# Patient Record
Sex: FEMALE | Race: ASIAN | NOT HISPANIC OR LATINO | URBAN - METROPOLITAN AREA
[De-identification: names, ages, dates, MRNs, and addresses within clinical notes are randomized per-mention and may not be internally consistent; named-entity substitution may affect disease eponyms.]

---

## 2020-06-01 ENCOUNTER — EMERGENCY (EMERGENCY)
Facility: HOSPITAL | Age: 80
LOS: 1 days | Discharge: HOME | End: 2020-06-01
Attending: EMERGENCY MEDICINE
Payer: MEDICARE

## 2020-06-01 VITALS
RESPIRATION RATE: 16 BRPM | HEART RATE: 114 BPM | WEIGHT: 98.11 LBS | OXYGEN SATURATION: 97 % | DIASTOLIC BLOOD PRESSURE: 94 MMHG | SYSTOLIC BLOOD PRESSURE: 196 MMHG | TEMPERATURE: 99 F | HEIGHT: 57 IN

## 2020-06-01 VITALS
RESPIRATION RATE: 17 BRPM | HEART RATE: 81 BPM | OXYGEN SATURATION: 97 % | TEMPERATURE: 98 F | DIASTOLIC BLOOD PRESSURE: 62 MMHG | SYSTOLIC BLOOD PRESSURE: 136 MMHG

## 2020-06-01 DIAGNOSIS — Z98.890 OTHER SPECIFIED POSTPROCEDURAL STATES: Chronic | ICD-10-CM

## 2020-06-01 PROCEDURE — 71250 CT THORAX DX C-: CPT

## 2020-06-01 PROCEDURE — 71250 CT THORAX DX C-: CPT | Mod: 26

## 2020-06-01 PROCEDURE — 74176 CT ABD & PELVIS W/O CONTRAST: CPT | Mod: 26

## 2020-06-01 PROCEDURE — 82962 GLUCOSE BLOOD TEST: CPT

## 2020-06-01 PROCEDURE — 72131 CT LUMBAR SPINE W/O DYE: CPT | Mod: 26

## 2020-06-01 PROCEDURE — 99285 EMERGENCY DEPT VISIT HI MDM: CPT

## 2020-06-01 PROCEDURE — 99284 EMERGENCY DEPT VISIT MOD MDM: CPT | Mod: 25

## 2020-06-01 PROCEDURE — 74176 CT ABD & PELVIS W/O CONTRAST: CPT

## 2020-06-01 PROCEDURE — 99283 EMERGENCY DEPT VISIT LOW MDM: CPT

## 2020-06-01 PROCEDURE — 72128 CT CHEST SPINE W/O DYE: CPT | Mod: 26

## 2020-06-01 RX ORDER — LIDOCAINE 4 G/100G
1 CREAM TOPICAL
Qty: 30 | Refills: 0
Start: 2020-06-01 | End: 2020-06-30

## 2020-06-01 RX ORDER — OXYCODONE HYDROCHLORIDE 5 MG/1
1 TABLET ORAL
Qty: 12 | Refills: 0
Start: 2020-06-01 | End: 2020-06-03

## 2020-06-01 RX ORDER — OXYCODONE HYDROCHLORIDE 5 MG/1
10 TABLET ORAL ONCE
Refills: 0 | Status: DISCONTINUED | OUTPATIENT
Start: 2020-06-01 | End: 2020-06-01

## 2020-06-01 RX ORDER — ACETAMINOPHEN 500 MG
975 TABLET ORAL ONCE
Refills: 0 | Status: COMPLETED | OUTPATIENT
Start: 2020-06-01 | End: 2020-06-01

## 2020-06-01 RX ORDER — LIDOCAINE 4 G/100G
1 CREAM TOPICAL ONCE
Refills: 0 | Status: COMPLETED | OUTPATIENT
Start: 2020-06-01 | End: 2020-06-01

## 2020-06-01 RX ADMIN — Medication 975 MILLIGRAM(S): at 11:28

## 2020-06-01 RX ADMIN — LIDOCAINE 1 PATCH: 4 CREAM TOPICAL at 11:27

## 2020-06-01 RX ADMIN — OXYCODONE HYDROCHLORIDE 10 MILLIGRAM(S): 5 TABLET ORAL at 11:28

## 2020-06-01 NOTE — ED PROVIDER NOTE - CARE PROVIDER_API CALL
[facility] NEUROSURGERY - GENERAL  [address] [address]  Phone: [phone]  Fax: [phone]  Follow Up Time:

## 2020-06-01 NOTE — ED PROVIDER NOTE - NSFOLLOWUPINSTRUCTIONS_ED_ALL_ED_FT
Rib Fracture(s)    A rib fracture is a break or crack in one of the bones of the ribs. The ribs are a group of long, curved bones that wrap around your chest and attach to your spine. A broken or cracked rib is often painful, but most do not cause other problems and heal on their own over time. Symptoms include pain when you breath or cough or pain when pressing over the area. Breathing exercises will help keep your lungs inflated and prevent lung collapse or infection.    SEEK IMMEDIATE MEDICAL CARE IF YOU HAVE ANY OF THE FOLLOWING SYMPTOMS: fever, shortness of breath, coughing up blood, abdominal pain, nausea or vomiting, pain not controlled with medications.    Take Tylenol 650mg every 6 hrs as needed for pain.  Oxycodone 5mg every 6 hours as needed for pain. Do not drive or consume alcohol while taking.  APply lidoderm patch to the area of maximal pain 1x per day     Follow up with spine clinic regarding your compression deformity/fracture

## 2020-06-01 NOTE — ED ADULT NURSE NOTE - NSIMPLEMENTINTERV_GEN_ALL_ED
DM (diabetes mellitus)    HLD (hyperlipidemia)    HTN (hypertension) Implemented All Fall with Harm Risk Interventions:  [place] to call system. Call bell, personal items and telephone within reach. Instruct patient to call for assistance. Room bathroom lighting operational. Non-slip footwear when patient is off stretcher. Physically safe environment: no spills, clutter or unnecessary equipment. Stretcher in lowest position, wheels locked, appropriate side rails in place. Provide visual cue, wrist band, yellow gown, etc. Monitor gait and stability. Monitor for mental status changes and reorient to person, place, and time. Review medications for side effects contributing to fall risk. Reinforce activity limits and safety measures with patient and family. Provide visual clues: red socks.

## 2020-06-01 NOTE — ED PROVIDER NOTE - PATIENT PORTAL LINK FT
You can access the FollowMyHealth Patient Portal offered by [facility] by registering at the following website: [email or web address]. By joining [facility]’s FollowMyHealth portal, you will also be able to view your health information using other applications (apps) compatible with our system.

## 2020-06-01 NOTE — ED PROVIDER NOTE - OBJECTIVE STATEMENT
Fall last Wednesday (5 days ago), had Xray at urgent care today, told broke 2 ribs and sent in to ED for eval. Fall from standing height to seated position. Does not think hit back on table. +Head strike. No LOC. Pain now worse. 10/10. Worse with deep inspiration and sneezing. Took advil today with minimal relief. NO abd pain, nv, headache, blood in urine or stool, weakness, numbness, tingling, trouble ambulating, SOB.

## 2020-06-01 NOTE — ED ADULT NURSE REASSESSMENT NOTE - NS ED NURSE REASSESS COMMENT FT1
pt provided with incentive spirometer to promote pulmonary toilet for confirmed L rib fx.  pt educated on proper use and demonstrated understanding via teach back.  pt resting in bed more comfortably awaiting md reeval and dispo.  will continue to monitor.

## 2020-06-01 NOTE — ED PROVIDER NOTE - CLINICAL SUMMARY MEDICAL DECISION MAKING FREE TEXT BOX
80 y/o female severe scoliosis and kyphosis presenting for fall last week. possibly pain prior to fall. no midline ttp. no red flag sx. known rib fx. concern for pathologic fx. will do imaging of chest/abd/pelvis with recons of spine.

## 2020-06-01 NOTE — CONSULT NOTE ADULT - SUBJECTIVE AND OBJECTIVE BOX
[facility] General Surgery Consultation     Patient is a 79y old Female who presents with a chief complaint of posterior chest pain.    HPI:    84F w/ pmh HTN, HLD (not taking any medications) presents c/o of posterior chest pain. Reports falling last Wednesday (5 days ago), had Xray at urgent care today, was told she had 2 rib fractures and was sent in to ED for eval. States her fall was from standing height to seated position, hitting table. Unsure if there was head strike, no LOC. Pain was initially 10/10 and worse with deep inspiration and sneezing. Took advil today with minimal relief. Denies abd pain, nv, headache, blood in urine or stool, weakness, numbness, tingling, trouble ambulating, SOB. Had CT demonstrating 8-10th rib fractures. States she feels better now and pain and SOB is resolved after receiving lidocaine patch and pain medication, requests to be discharged home. Incentive Spirometry: 600      PAST MEDICAL & SURGICAL HISTORY:  Osteoporosis: Prolia injection twice yearly.  Scoliosis  High cholesterol  HTN (hypertension)  S/P thyroid surgery: now on replacement      FAMILY HISTORY:      SOCIAL HISTORY:  - Lives at home but currently staying with sister who takes care of patient  - Denies ETOH or tobacco use    MEDICATIONS  (STANDING):    MEDICATIONS  (PRN):    Allergies    No Known Allergies    Intolerances        Vital Signs Last 24 Hrs  T(C): 37.1 (01 Jun 2020 10:39), Max: 37.1 (01 Jun 2020 10:39)  T(F): 98.8 (01 Jun 2020 10:39), Max: 98.8 (01 Jun 2020 10:39)  HR: 114 (01 Jun 2020 10:39) (114 - 114)  BP: 196/94 (01 Jun 2020 10:39) (196/94 - 196/94)  BP(mean): --  RR: 16 (01 Jun 2020 10:39) (16 - 16)  SpO2: 97% (01 Jun 2020 10:39) (97% - 97%)  Daily Height in cm: 144.78 (01 Jun 2020 10:39)    Daily     General: NAD, well-nourished  HEENT: Atraumatic, EOMI  Resp: Breathing comfortably on RA, nontender to chest wall palpation  CV: Normal sinus rhythm  Abd: soft, ND, nontender  Ext: ROMIx4, motor strength intact x 4                  Radiographic Findings:     INTERPRETATION:  Clinical indication: Fell one week ago with back pain    Serial thin sections on a multi slice scanner were obtained through the thoracic and lumbosacral spine from the T1 to the S1 level in a stacked axial fashion reformatted at 1.25 mm sections with sagittal and coronal computer generated reconstructed views. Images were returned reconstructed from a chest and abdomen CT.    No prior spine imaging is available for comparison.    There is generalized osteopenia. 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 No acute fractures are identified. Endplates appear intact. This appears to be chronic.    Paraspinal soft tissues are unremarkable.    There is a small left pleural effusion.    IMPRESSION: No fractures are identified. Osteopenia. 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      IMPRESSION:     Minimally displaced left rib fractures as described above. Associated trace left pleural effusion and bibasilar atelectasis.    No CT evidence of traumatic injury in the abdomen and pelvis.    Severe scoliosis of the spine. Please see dedicated report for CT lumbar and thoracic spine performed concurrently.                    CARRIE PRIDE M.D., ATTENDING RADIOLOGIST  This document has been electronically signed. Jun 1 2020  1:14PM

## 2020-06-01 NOTE — ED PROVIDER NOTE - ATTENDING CONTRIBUTION TO CARE
79y F hx high cholesterol, HTN, osteoporosis, scoliosis here with L posterior rib pain sp fall 5 days ago, OP imaging c/w rib fractures. Pt with scoliosis, thoracic kyphosis and "S" curve. L posterior rib TTP, no ecchymosis. Neuro intact. No LE weakness or change in sensation. Do not suspect SCC. Will obtain imaging to eval for rib fractures, vertebral compression fx, pain control, re-eval.

## 2020-06-01 NOTE — CONSULT NOTE ADULT - ASSESSMENT
84F w/ pmh HTN, HLD (not taking any medications) presents c/o of posterior chest pain after fall 5 days ago, CT demonstrating 8-10th rib fractures. States she feels better now and pain and SOB is resolved after receiving pain medication.    - No surgical intervention indicated at this time  - Recommend multimodal pain control  - Recommend incentive spirometry  - Discharge per ED  - Discussed with Dr. DEREK Burns, PGY2  Acute Care Surgery  [id number] with any questions

## 2020-06-01 NOTE — ED ADULT NURSE NOTE - OBJECTIVE STATEMENT
80 y/o female with pmhx of scoliosis c/o L back to rib pain 8/10 s/p fall from standing onto her buttocks x 5 days ago.  per pt, she did hit her head but denies any LOC.  pt ststes she was sent from  for possible rib fx.  tenderness noted to affected area upon palpation; no ecchymosis or edema noted at this time.  pt is awake, alert, and responsive to all stimuli.  no sob or respiratory distress noted at this time.  vss.  safety precautions in place. will continue to monitor.

## 2020-06-01 NOTE — ED PROVIDER NOTE - PROGRESS NOTE DETAILS
incentive spirometer - 500-750. discussed all anticipatory guidance, how to use incentive spirometer and instructions with return precautions as well as CT findings including compression deformity/fx  via  id# [id number] cleared to be dced from trauma standpoint - Noreen Mcdaniels PA-C

## 2020-06-01 NOTE — CONSULT NOTE ADULT - ATTENDING COMMENTS
79F fall from standing 5 days ago, no LOC,  seen and examined 06-01-20 @ 1450.    minimal left posterolateral chest wall tenderness with no crepitus  no overlying ecchymosis      left 8-10 posterolateral nondisplaced rib fractures  -ok to discharge home with pain control    small traumatic hemothorax  -no further workup given delayed presentation

## 2023-12-11 NOTE — ED ADULT TRIAGE NOTE - ESI TRIAGE ACUITY LEVEL, MLM
I called Babak Johnson in response to a referral that was received for patient to establish care with Hematology. Outreach was made to schedule a consultation. I left a voicemail explaining the reason for my call and advised patient to call [facility] at [phone]. Another attempt will be made to contact patient. 3